# Patient Record
Sex: FEMALE | Race: WHITE
[De-identification: names, ages, dates, MRNs, and addresses within clinical notes are randomized per-mention and may not be internally consistent; named-entity substitution may affect disease eponyms.]

---

## 2019-10-17 ENCOUNTER — HOSPITAL ENCOUNTER (INPATIENT)
Dept: HOSPITAL 74 - JER | Age: 29
LOS: 1 days | Discharge: HOME | DRG: 48 | End: 2019-10-18
Attending: INTERNAL MEDICINE | Admitting: INTERNAL MEDICINE
Payer: COMMERCIAL

## 2019-10-17 VITALS — BODY MASS INDEX: 26.6 KG/M2

## 2019-10-17 DIAGNOSIS — D72.829: ICD-10-CM

## 2019-10-17 DIAGNOSIS — K21.9: ICD-10-CM

## 2019-10-17 DIAGNOSIS — M25.511: ICD-10-CM

## 2019-10-17 DIAGNOSIS — F31.9: ICD-10-CM

## 2019-10-17 DIAGNOSIS — K64.9: ICD-10-CM

## 2019-10-17 DIAGNOSIS — I45.10: ICD-10-CM

## 2019-10-17 DIAGNOSIS — J45.909: ICD-10-CM

## 2019-10-17 DIAGNOSIS — G51.0: Primary | ICD-10-CM

## 2019-10-17 DIAGNOSIS — R32: ICD-10-CM

## 2019-10-17 LAB
ALBUMIN SERPL-MCNC: 4.3 G/DL (ref 3.4–5)
ALP SERPL-CCNC: 87 U/L (ref 45–117)
ALT SERPL-CCNC: 23 U/L (ref 13–61)
ANION GAP SERPL CALC-SCNC: 10 MMOL/L (ref 8–16)
APPEARANCE UR: CLEAR
AST SERPL-CCNC: 14 U/L (ref 15–37)
BASOPHILS # BLD: 0.5 % (ref 0–2)
BILIRUB SERPL-MCNC: 0.2 MG/DL (ref 0.2–1)
BILIRUB UR STRIP.AUTO-MCNC: NEGATIVE MG/DL
BUN SERPL-MCNC: 11.8 MG/DL (ref 7–18)
CALCIUM SERPL-MCNC: 9.3 MG/DL (ref 8.5–10.1)
CHLORIDE SERPL-SCNC: 105 MMOL/L (ref 98–107)
CO2 SERPL-SCNC: 24 MMOL/L (ref 21–32)
COLOR UR: YELLOW
CREAT SERPL-MCNC: 0.8 MG/DL (ref 0.55–1.3)
DEPRECATED RDW RBC AUTO: 13.7 % (ref 11.6–15.6)
EOSINOPHIL # BLD: 1.9 % (ref 0–4.5)
GLUCOSE SERPL-MCNC: 126 MG/DL (ref 74–106)
HCT VFR BLD CALC: 43.1 % (ref 32.4–45.2)
HGB BLD-MCNC: 14.1 GM/DL (ref 10.7–15.3)
KETONES UR QL STRIP: NEGATIVE
LEUKOCYTE ESTERASE UR QL STRIP.AUTO: NEGATIVE
LYMPHOCYTES # BLD: 17.4 % (ref 8–40)
MCH RBC QN AUTO: 30.3 PG (ref 25.7–33.7)
MCHC RBC AUTO-ENTMCNC: 32.8 G/DL (ref 32–36)
MCV RBC: 92.6 FL (ref 80–96)
MONOCYTES # BLD AUTO: 7.7 % (ref 3.8–10.2)
NEUTROPHILS # BLD: 72.5 % (ref 42.8–82.8)
NITRITE UR QL STRIP: NEGATIVE
PH UR: 6 [PH] (ref 5–8)
PLATELET # BLD AUTO: 208 K/MM3 (ref 134–434)
PMV BLD: 10.2 FL (ref 7.5–11.1)
POTASSIUM SERPLBLD-SCNC: 4.2 MMOL/L (ref 3.5–5.1)
PROT SERPL-MCNC: 7.5 G/DL (ref 6.4–8.2)
PROT UR QL STRIP: NEGATIVE
PROT UR QL STRIP: NEGATIVE
RBC # BLD AUTO: 4.65 M/MM3 (ref 3.6–5.2)
SODIUM SERPL-SCNC: 139 MMOL/L (ref 136–145)
SP GR UR: 1.02 (ref 1.01–1.03)
UROBILINOGEN UR STRIP-MCNC: 0.2 MG/DL (ref 0.2–1)
WBC # BLD AUTO: 12.1 K/MM3 (ref 4–10)

## 2019-10-17 PROCEDURE — A9579 GAD-BASE MR CONTRAST NOS,1ML: HCPCS

## 2019-10-17 NOTE — PDOC
Documentation entered by Paloma Knowles SCRIBE, acting as scribe for Jennifer Tejada DO.








Jennifer Tejada DO:  This documentation has been prepared by the Eleni canas Brenda, SCRIBE, under my direction and personally reviewed by me in its 

entirety.  I confirm that the documentation accurately reflects all work, 

treatment, procedures, and medical decision making performed by me.  





Attending Attestation





- Resident


Resident Name: [place]





- ED Attending Attestation


I have performed the following: I have examined & evaluated the patient, The 

case was reviewed & discussed with the resident, I agree w/resident's findings 

& plan, Exceptions are as noted





- HPI


HPI: 





10/17/19 18:43


The patient is a 29 year old female, with a significant PMH of GERD, Bipolar 

disorder and asthma who presents to the emergency department from PCPs office 

for evaluation of a right facial droop. Patient states that a few weeks ago her 

friend noticed that she had a facial droop, but she did not think anything of 

it. She states that when her mom noticed a few days ago, she scheduled an 

appointment with her primary physician today, which she saw his NP for and was 

sent to the ED for evaluation. Patient states that she went on a camping trip 

in May of 2019. She also admits to 1 week of left shoulder pain, and one month 

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 

She also reports 1 month of incontinence, which she states that shell have 

episodes of laughing and urinating, and having wet the bed several times. 





The patient denies chest pain, shortness of breath, headache and dizziness. 

Denies fever, chills, nausea, vomiting, diarrhea and constipation. Denies 

dysuria and hematuria. Denies numbness 





Allergies: NKA


Past surgical history: None reported 


Family History: DM and CVA 


Social history: No tobacco use, alcohol use or illicit drug use. 


PCP: Dr. Keane 








- Physicial Exam


PE: 





10/17/19 18:43


GENERAL: Awake, alert, and fully oriented, in no acute distress


HEAD: No signs of trauma


EYES: PERRLA, EOMI, sclera anicteric, conjunctiva clear


ENT: Auricles normal inspection, hearing grossly normal, nares patent, 

oropharynx clear without exudates. Moist mucosa


NECK: Normal ROM, supple, no lymphadenopathy, JVD, or masses


LUNGS: Breath sounds equal, clear to auscultation bilaterally.  No wheezes, and 

no crackles


HEART: Regular rate and rhythm, normal S1 and S2, no murmurs, rubs or gallops


ABDOMEN: Soft, nontender, normoactive bowel sounds.  No guarding, no rebound.  

No masses


EXTREMITIES: Normal range of motion, no edema.  No clubbing or cyanosis. No 

cords, erythema, or tenderness


NEUROLOGICAL: (+) Right facial droop. Finger to nose normal. Cranial nerves II 

through XII are not intact.  Normal speech, normal gait. 


SKIN: Warm, Dry, normal turgor, no rashes or lesions noted.








- Medical Decision Making





10/17/19 18:54








I, Dr. Jennifer Tejada, DO, attest that this document has been prepared under 

my direction and personally reviewed by me in its entirety.   I further attest, 

that it accurately reflects all work, treatment, procedures and medical decision

-making performed by me.  





a/p: 30yo female with R facial droop and intermittent R hand numbness/tingling, 

intermittent episodes of incontinence


-went hiking in May


-no rashes she noticed


-intermittent R shoulder pain


-3 weeks of R facial droop


-1 m of incontinence -nocturia


-1m of R hand weakness episodes -will drop the paint brush for unknown reason


-will send labs, head ct 


-will send lyme


-ebv


-will discuss with neurology


-concern for lyme vs ms vs intracranial lesion


-will need MRI if head ct neg


10/17/19 20:41


no acute findings on head ct


10/17/19 21:05


resident discussed the case with Dr. Haney 


10/17/19 21:09


dr. haney requests steroids


no abx


will see in consult


MRI head and c spine with contrast


10/17/19 21:22


resident discussed the case with symphony and accepts pt to service





**Heart Score/ECG Review





- ECG Intrepretation


Comment:: 





10/17/19 19:34


sinus santo at 57, incomplete RBBB, no acute st/t wave findings

## 2019-10-17 NOTE — PDOC
History of Present Illness





- General


Chief Complaint: Facial Droop


Stated Complaint: SENT BY DOCTOR


Time Seen by Provider: 10/17/19 16:44


History Source: Patient


Exam Limitations: No Limitations





- History of Present Illness


Initial Comments: 





10/17/19 17:47


Carrie Smith is a 29yF w PMHx GERD, asthma, bipolar presenting w R mouth droop 

and urinary incontinence. 3wk sudden onset R mouth droop, urinary incontinence (

urinating pants, bed), intermittent R hand  strength weakness (dropping 

things). Also has 3mo epigastric discomfort attributed to GERD, taking daily 

pepcid.  Switched to lithium 2 months ago, psychiatrist denies lithium causing 

symptoms. Denies recent travel, family hx autoimmune disorders. Denies alcohol, 

smoking, illicit drug use. Denies fever, headache, cough, SOB, chest pain, 

bowel movement changes. Went hiking/camping in May. 








tPA Exclusion Checklist 0-3hr





- Time Elapsed


Date last known well: 09/17/19


Time last known well: 00:00


Elaspsed time: 30 Day(s) and 23 Hour(s) and 7 Minutes 





- Thrombolytic Therapy Candidate


Is the patient eligible for Thrombolytic Therapy?: No





- Exclusion Criteria 0-3hr


SBP greater than 185 or DBP greater than 110mmHg despite tx: No


Recent IC/spinal surgery,head trauma or stroke w/in last 3mo: No


Hx of previous IC hemorrhage, IC neoplasm, AVM or aneurysm: No


Active internal bleeding: No


Blding diathesis(low plt ct, inc PTT,INR>1.7 or use of NOAC): No


Symptoms suggest subarachnoid hemorrhage: No


CT demonstrates multilobar infarct(>1/3 cerebral hemiphere): No


Arterial puncture at noncompressible site in previous 7 days: No


Blood glucose concentration less than 50mg/dL (2.7mmol/L): No





- Relative Exclusion Criteria 0-3h


Life expectancy <1yr/severe co-morbid illness/CMO on admit: No


Pregnancy: No


Patient/family refused: No


Rapid improvement: No


Stroke severity too mild: Yes


Recent acute MI (w/in previous 3 months): No


Seizure at onset with postictal residual neuro impairments: No


Major surgery or serious trauma w/in previous 14 days: No


Recent GI or  hemorrhage (w/in previous 21 days): No





- Ineligibility reason(s)


Reasons No tPA given: Outside of window - delayed arrival (30d facial droop)





NIH Stroke Scale





- Last Known Well Date/Time & Onset


Date Last Known Well: 09/17/19


Time Last Known Well: 00:00





- Initial Evaluation


Level of consciousness: Alert


Ask patient the month and their age: Answers both correctly


Ask patient to open & close eyes; make fist and let go: Obeys both correctly


Best gaze (horizontal eye movement): Normal


Visual field testing: No visual field loss


Facial paresis (Show teeth/raise eyebrows/close eyes tight): Minor paralysis (

flattened nasolabial fold, asymmetry on smiling)


Motor Function: Left Arm: Normal


Motor Function:  Right Arm: Normal (extends arm 90 (or 45) degrees for 10 

seconds without drift


Motor Function:  Left Leg: Normal (extends leg 30 degrees for 5 seconds without 

drift)


Motor Function:  Right Leg: Normal (extends leg 30 degrees for 5 seconds 

without drift)


Limb Ataxia: No ataxia


Sensory(Use pinprick test arms,legs,trunk,face/side to side): Normal


Best language (Describe picture, name items, read sentences): No Aphasia


Dysarthria (read several words): Normal articulation


Extinction and Inattention: No abnormality





- Total Score


NIH Stroke Scale Score: 1





Past History





- Travel


Traveled outside of the country in the last 30 days: No


Close contact w/someone who was outside of country & ill: No





- Past Medical History


Allergies/Adverse Reactions: 


 Allergies











Allergy/AdvReac Type Severity Reaction Status Date / Time


 


No Known Allergies Allergy   Verified 10/17/19 16:47











Asthma: Yes


COPD: No


GI Disorders: Yes (GERD)


Psychiatric Problems: Yes (Bipolar)





- Immunization History


Immunization Up to Date: No





- Psycho Social/Smoking Cessation Hx


Smoking History: Never smoked


Have you smoked in the past 12 months: No


Information on smoking cessation initiated: No


Hx Alcohol Use: No


Drug/Substance Use Hx: No





**Review of Systems





- Review of Systems


Able to Perform ROS?: Yes


Constitutional: No: Chills, Fever


HEENTM: No: Eye Pain, Recent change in vision, Nose Pain, Throat Pain, Mouth 

Pain


Respiratory: No: Cough, Shortness of Breath


Cardiac (ROS): No: Chest Pain, Palpitations, Syncope


ABD/GI: No: Abdominal Distended, Constipated, Diarrhea, Nausea, Vomiting


: Yes: Incontinence.  No: Dysuria, Discharge, Flank Pain, Hematuria


Musculoskeletal: No: Back Pain, Joint Pain, Muscle Pain


Integumentary: No: Bruising, Flushing, Lesions


Neurological: No: Headache, Numbness, Seizure, Tingling, Tremors


Psychiatric: No: Anxiety, Depression, Stressors


Endocrine: No: Excessive Sweating, Flushing, Intolerance to Cold, Intolerance 

to Heat


Hematologic/Lymphatic: No: Anemia, Blood Clots





*Physical Exam





- Vital Signs


 Last Vital Signs











Temp Pulse Resp BP Pulse Ox


 


 98.2 F   71   18   126/70   98 


 


 10/17/19 16:44  10/17/19 16:44  10/17/19 16:44  10/17/19 16:44  10/17/19 16:44














- Physical Exam


General Appearance: Yes: Nourished, Appropriately Dressed.  No: Apparent 

Distress


HEENT: positive: EOMI, KALEE, Normal Voice, Hearing Grossly Normal.  negative: 

Scleral Icterus (R), Scleral Icterus (L), Nasal Congestion, Rhinorrhea


Respiratory/Chest: positive: Lungs Clear, Normal Breath Sounds.  negative: 

Chest Tender, Respiratory Distress, Crackles, Rales, Rhonchi, Stridor, Wheezing


Cardiovascular: positive: Regular Rhythm, Regular Rate, S1, S2.  negative: Edema

, Murmur


Gastrointestinal/Abdominal: positive: Normal Bowel Sounds, Flat, Soft.  negative

: Tender, Organomegaly, Distended, Guarding


Musculoskeletal: negative: CVA Tenderness (R), CVA Tenderness (L)


Integumentary: positive: Normal Color


Neurologic: positive: CNs II-XII NML intact, Fully Oriented, Alert, Normal 

Response, Motor Strength 5/5, Facial Droop (R mouth).  negative: Confused, 

Disoriented





ED Treatment Course





- LABORATORY


CBC & Chemistry Diagram: 


 10/17/19 18:45





 10/17/19 18:45





Medical Decision Making





- Medical Decision Making





10/17/19 17:49


CBC CMP trop UA EKG Head CT Lyme HSV EBV


Head CT shows no acute bleed/infarct. Minimal mucosal thickening in L 

paramedian aspect of sphenoid sinus concerning for small retension cyst vs 

polyp 7mm.


WBC 12, trop neg, neg HCG, normal UA


EKG sinus bradycardia HR 57, QTc 412, RBBB


Lurasidone, lithium, buspirone for bipolar, maalox, pantoprazole for epigastric 

discomfort





---





Carrie Smith is a 29yF w PMHx GERD, asthma, bipolar presenting w 3wk R mouth 

droop, urinary incontinence, R hand  weakness concerning for multiple 

sclerosis vs bell's palsy vs Lyme. Not pregnant, no evidence of ACS (no ST 

changes EKG, neg trop) or UTI. Low concern for CVA (NIHSS score 1, no risk 

factors, no head bleed/infarct on CT, tPA not given). Pending Lyme, HSV, EBV, 

lithium. Given lurasidone, lithium, buspirone for bipolar, maalox, pantoprazole 

for epigastric discomfort, prednisone 





Consulted Dr Alegria neuro


-advised brain/c-spine MRI w contrast rule out MS, give prednisone





Admitted to Dr Urbano med/surg for R mouth droop, urinary incontinence, R hand 

 weakness


-pending brain/c-spine MRI








Discharge





- Discharge Information


Problems reviewed: Yes


Clinical Impression/Diagnosis: 


 Facial droop





Urinary incontinence


Qualifiers:


 Urinary Incontinence type: unspecified incontinence Qualified Code(s): R32 - 

Unspecified urinary incontinence





Condition: Good





- Follow up/Referral





- Patient Discharge Instructions





- Post Discharge Activity

## 2019-10-17 NOTE — PDOC
Rapid Medical Evaluation


Time Seen by Provider: 10/17/19 16:44


Medical Evaluation: 





10/17/19 16:44


I have performed a brief in-person evaluation of this patient.


The patient presents with a chief complaint of: facial droop to R side x 3 weeks

, sent by Dr. Kay for MRI, denies AMS/change in vision/


Pertinent physical exam findings: R facial droop/ twitching to R eye


I have ordered the following: nothing 


The patient will proceed to the ED for further evaluation.





**Discharge Disposition





- Diagnosis


 Facial droop








- Referrals





- Patient Instructions





- Post Discharge Activity

## 2019-10-18 VITALS — DIASTOLIC BLOOD PRESSURE: 58 MMHG | TEMPERATURE: 99 F | HEART RATE: 71 BPM | SYSTOLIC BLOOD PRESSURE: 105 MMHG

## 2019-10-18 LAB
AMPHET UR-MCNC: NEGATIVE NG/ML
ANION GAP SERPL CALC-SCNC: 8 MMOL/L (ref 8–16)
BARBITURATES UR-MCNC: NEGATIVE NG/ML
BENZODIAZ UR SCN-MCNC: NEGATIVE NG/ML
BUN SERPL-MCNC: 12.1 MG/DL (ref 7–18)
CALCIUM SERPL-MCNC: 9.4 MG/DL (ref 8.5–10.1)
CHLORIDE SERPL-SCNC: 106 MMOL/L (ref 98–107)
CO2 SERPL-SCNC: 24 MMOL/L (ref 21–32)
COCAINE UR-MCNC: NEGATIVE NG/ML
CREAT SERPL-MCNC: 0.9 MG/DL (ref 0.55–1.3)
DEPRECATED RDW RBC AUTO: 13.4 % (ref 11.6–15.6)
ERYTHROCYTE [SEDIMENTATION RATE] IN BLOOD BY WESTERGREN METHOD: 3 MM/HR (ref 0–20)
GLUCOSE SERPL-MCNC: 139 MG/DL (ref 74–106)
HCT VFR BLD CALC: 40.2 % (ref 32.4–45.2)
HGB BLD-MCNC: 13.7 GM/DL (ref 10.7–15.3)
MCH RBC QN AUTO: 31.3 PG (ref 25.7–33.7)
MCHC RBC AUTO-ENTMCNC: 34 G/DL (ref 32–36)
MCV RBC: 92.1 FL (ref 80–96)
METHADONE UR-MCNC: NEGATIVE NG/ML
OPIATES UR QL SCN: NEGATIVE NG/ML
PCP UR QL SCN: NEGATIVE NG/ML
PLATELET # BLD AUTO: 181 K/MM3 (ref 134–434)
PMV BLD: 10.2 FL (ref 7.5–11.1)
POTASSIUM SERPLBLD-SCNC: 4.6 MMOL/L (ref 3.5–5.1)
RBC # BLD AUTO: 4.36 M/MM3 (ref 3.6–5.2)
SODIUM SERPL-SCNC: 138 MMOL/L (ref 136–145)
WBC # BLD AUTO: 10.6 K/MM3 (ref 4–10)

## 2019-10-18 NOTE — PN
Teaching Attending Note


Name of Resident: Nadiya Almeida





ATTENDING PHYSICIAN STATEMENT





I saw and evaluated the patient.


I reviewed the resident's note and discussed the case with the resident.


I agree with the resident's findings and plan as documented with exceptions 

below.








SUBJECTIVE:


Patient seen and examined. no new complaints, overall unchanged





OBJECTIVE:


 Vital Signs











 Period  Temp  Pulse  Resp  BP Sys/Cavazos  Pulse Ox


 


 Last 24 Hr  98.2 F-99 F  52-71  17-18  101-126/58-70  








 Intake & Output











 10/15/19 10/16/19 10/17/19 10/18/19





 23:59 23:59 23:59 23:59


 


Weight   160 lb 











General: sitting in stretcher, no new complaints


Neck: soft, supple


Chest: CTAB, no rales or wheezing


Abdomen:soft, NT


Extremities: no edema


Neuro: AAOx3, mild right nasolabial flattening, non focal exam otherwise





MRI brain/C-spine results reviewed





ASSESSMENT AND PLAN:


29 yof with PMHx of GERD, Asthma, hemorrhoids, Bipolar disorder, admitted with 

right hand weakness, urinary incontinence and right facial droop gradual over 

the period of last month





-Right facial droop, ?Bell's palsy


-Recently diagnosed bipolar disorder


-Asthma


-GERD


-Haemorrhoids





Plan:


MRI brain/Cspine non concerning


neurology input noted


Discussed with patient outpatient neurology follow up for EMG, outpt psych 

follow up


dc home today


discussed with patient in detail, all questions answered.

## 2019-10-18 NOTE — EKG
Test Reason : 

Blood Pressure : ***/*** mmHG

Vent. Rate : 057 BPM     Atrial Rate : 057 BPM

   P-R Int : 150 ms          QRS Dur : 104 ms

    QT Int : 424 ms       P-R-T Axes : 065 030 046 degrees

   QTc Int : 412 ms

 

SINUS BRADYCARDIA

POSSIBLE LEFT ATRIAL ENLARGEMENT

INCOMPLETE RIGHT BUNDLE BRANCH BLOCK

NO PREVIOUS ECGS AVAILABLE

Confirmed by BELKIS PADILLA MD ([id number]) on 10/18/2019 12:49:56 PM

 

Referred By:             Confirmed By:BELIKS PADILLA MD

## 2019-10-18 NOTE — PN
Teaching Attending Note


Name of Resident: Meena Power





ATTENDING PHYSICIAN STATEMENT





I saw and evaluated the patient.


I reviewed the resident's note and discussed the case with the resident.


I agree with the resident's findings and plan as documented.








SUBJECTIVE:


29yF w PMHx GERD, asthma, bipolar presenting w R mouth droop and urinary 

incontinence. 2 wk sudden onset R mouth droop, urinary incontinence (urinating 

pants, bed), intermittent R hand  strength weakness (dropping things). 

Started taking lithium 2months ago. 





OBJECTIVE:


 Last Vital Signs











Temp Pulse Resp BP Pulse Ox


 


 98.2 F   52 L  18   101/62   97 


 


 10/17/19 16:44  10/18/19 00:48  10/18/19 00:48  10/18/19 00:48  10/18/19 00:48








gen -nontoxic appearing, not in distress 


heent perrla, no sinus tenderness 


neck - no jvd 


cv s1+s2+rrr


chest clear b/l 


abd -soft, bs +, nt


ext - no edema 





 Abnormal Lab Results











  10/17/19 10/17/19





  18:45 18:45


 


WBC  12.1 H 


 


Absolute Neuts (auto)  8.7 H 


 


Random Glucose   126 H


 


AST   14 L





imaging reviewed 








ASSESSMENT AND PLAN:


#right facial droop, urinary incontinence,Right hand  weakness -multiple 

neurological symptoms which are  in time, concerning for  demylinating 

CNS disease such as MS. Differential diagnosis includes also syphilis, vit b12 

deficiency. Should also rule out lithium toxicity as patient has recently 

started taking lithium for her bipolar management. 


-med/surg 


-brain mri 


-lyme serology 


-rpr 


-vit b12 levels 


-urine toxicology 


-send lithium level 


-consider neurology evaluation 


-dvt ppx -heparin sc

## 2019-10-18 NOTE — DS
Physical Exam: 


SUBJECTIVE: Patient seen and examined. She reports 








OBJECTIVE:





 Vital Signs











 Period  Temp  Pulse  Resp  BP Sys/Cavazos  Pulse Ox


 


 Last 24 Hr  99 F  52-71  17-18  101-105/58-62  97-99








PHYSICAL EXAM





GENERAL: The patient is awake, alert, and fully oriented, in no acute distress.


HEAD: Normal with no signs of trauma.


EYES: PERRL, extraocular movements intact, sclera anicteric, conjunctiva clear. 


ENT: Ears normal, nares patent, oropharynx clear without exudates, moist mucous 

membranes.


NECK: Trachea midline, full range of motion, supple. 


LUNGS: Breath sounds equal, clear to auscultation bilaterally, no wheezes, no 

crackles, no accessory muscle use. 


HEART: Regular rate and rhythm, S1, S2 without murmur, rub or gallop.


ABDOMEN: Soft, nontender, nondistended, normoactive bowel sounds, no guarding, 

no rebound, no hepatosplenomegaly, no masses.


EXTREMITIES: 2+ pulses, warm, well-perfused, no edema. 


NEUROLOGICAL: Cranial nerves II through XII grossly intact. Normal speech, gait 

not observed.


PSYCH: Normal mood, normal affect.


SKIN: Warm, dry, normal turgor, no rashes or lesions noted.





LABS


 Laboratory Results - last 24 hr











  10/17/19 10/17/19 10/17/19





  18:45 18:45 18:45


 


WBC  12.1 H  


 


RBC  4.65  


 


Hgb  14.1  


 


Hct  43.1  


 


MCV  92.6  


 


MCH  30.3  


 


MCHC  32.8  


 


RDW  13.7  


 


Plt Count  208  


 


MPV  10.2  


 


Absolute Neuts (auto)  8.7 H  


 


Neutrophils %  72.5  


 


Lymphocytes %  17.4  


 


Monocytes %  7.7  


 


Eosinophils %  1.9  


 


Basophils %  0.5  


 


Nucleated RBC %  0  


 


ESR   


 


Sodium   139 


 


Potassium   4.2 


 


Chloride   105 


 


Carbon Dioxide   24 


 


Anion Gap   10 


 


BUN   11.8 


 


Creatinine   0.8 


 


Est GFR (CKD-EPI)AfAm   117.10 


 


Est GFR (CKD-EPI)NonAf   101.04 


 


Random Glucose   126 H 


 


Calcium   9.3 


 


Total Bilirubin   0.2 


 


AST   14 L 


 


ALT   23 


 


Alkaline Phosphatase   87 


 


Troponin I    < 0.02


 


C-Reactive Protein   


 


Total Protein   7.5 


 


Albumin   4.3 


 


Vitamin B12   


 


Serum Folate   


 


TSH   


 


Serum Pregnancy, Qual   


 


Urine Color   


 


Urine Appearance   


 


Urine pH   


 


Ur Specific Gravity   


 


Urine Protein   


 


Urine Glucose (UA)   


 


Urine Ketones   


 


Urine Blood   


 


Urine Nitrite   


 


Urine Bilirubin   


 


Urine Urobilinogen   


 


Ur Leukocyte Esterase   


 


Opiates Screen   


 


Methadone Screen   


 


Barbiturate Screen   


 


Phencyclidine Screen   


 


Ur Amphetamines Screen   


 


MDMA (Ecstasy) Screen   


 


Benzodiazepines Screen   


 


Cocaine Screen   


 


U Marijuana (THC) Screen   


 


RPR Titer   














  10/17/19 10/17/19 10/18/19





  18:45 18:45 03:15


 


WBC   


 


RBC   


 


Hgb   


 


Hct   


 


MCV   


 


MCH   


 


MCHC   


 


RDW   


 


Plt Count   


 


MPV   


 


Absolute Neuts (auto)   


 


Neutrophils %   


 


Lymphocytes %   


 


Monocytes %   


 


Eosinophils %   


 


Basophils %   


 


Nucleated RBC %   


 


ESR   


 


Sodium   


 


Potassium   


 


Chloride   


 


Carbon Dioxide   


 


Anion Gap   


 


BUN   


 


Creatinine   


 


Est GFR (CKD-EPI)AfAm   


 


Est GFR (CKD-EPI)NonAf   


 


Random Glucose   


 


Calcium   


 


Total Bilirubin   


 


AST   


 


ALT   


 


Alkaline Phosphatase   


 


Troponin I   


 


C-Reactive Protein   


 


Total Protein   


 


Albumin   


 


Vitamin B12   


 


Serum Folate   


 


TSH   


 


Serum Pregnancy, Qual   Negative 


 


Urine Color  Yellow  


 


Urine Appearance  Clear  


 


Urine pH  6.0  


 


Ur Specific Gravity  1.020  


 


Urine Protein  Negative  


 


Urine Glucose (UA)  Negative  


 


Urine Ketones  Negative  


 


Urine Blood  Negative  


 


Urine Nitrite  Negative  


 


Urine Bilirubin  Negative  


 


Urine Urobilinogen  0.2  


 


Ur Leukocyte Esterase  Negative  


 


Opiates Screen    Cancelled


 


Methadone Screen    Cancelled


 


Barbiturate Screen    Cancelled


 


Phencyclidine Screen    Cancelled


 


Ur Amphetamines Screen    Cancelled


 


MDMA (Ecstasy) Screen    Cancelled


 


Benzodiazepines Screen    Cancelled


 


Cocaine Screen    Cancelled


 


U Marijuana (THC) Screen    Cancelled


 


RPR Titer   














  10/18/19 10/18/19 10/18/19





  05:22 05:22 05:22


 


WBC   10.6 H 


 


RBC   4.36 


 


Hgb   13.7 


 


Hct   40.2 


 


MCV   92.1 


 


MCH   31.3 


 


MCHC   34.0 


 


RDW   13.4 


 


Plt Count   181 


 


MPV   10.2 


 


Absolute Neuts (auto)   


 


Neutrophils %   


 


Lymphocytes %   


 


Monocytes %   


 


Eosinophils %   


 


Basophils %   


 


Nucleated RBC %   


 


ESR   3 


 


Sodium  138  


 


Potassium  4.6  


 


Chloride  106  


 


Carbon Dioxide  24  


 


Anion Gap  8  


 


BUN  12.1  


 


Creatinine  0.9  


 


Est GFR (CKD-EPI)AfAm  101.56  


 


Est GFR (CKD-EPI)NonAf  87.63  


 


Random Glucose  139 H  


 


Calcium  9.4  


 


Total Bilirubin   


 


AST   


 


ALT   


 


Alkaline Phosphatase   


 


Troponin I   


 


C-Reactive Protein  1.1 H  


 


Total Protein   


 


Albumin   


 


Vitamin B12  987 H  


 


Serum Folate  18 H  


 


TSH  1.81  


 


Serum Pregnancy, Qual   


 


Urine Color   


 


Urine Appearance   


 


Urine pH   


 


Ur Specific Gravity   


 


Urine Protein   


 


Urine Glucose (UA)   


 


Urine Ketones   


 


Urine Blood   


 


Urine Nitrite   


 


Urine Bilirubin   


 


Urine Urobilinogen   


 


Ur Leukocyte Esterase   


 


Opiates Screen   


 


Methadone Screen   


 


Barbiturate Screen   


 


Phencyclidine Screen   


 


Ur Amphetamines Screen   


 


MDMA (Ecstasy) Screen   


 


Benzodiazepines Screen   


 


Cocaine Screen   


 


U Marijuana (THC) Screen   


 


RPR Titer    Nonreactive














  10/18/19





  08:57


 


WBC 


 


RBC 


 


Hgb 


 


Hct 


 


MCV 


 


MCH 


 


MCHC 


 


RDW 


 


Plt Count 


 


MPV 


 


Absolute Neuts (auto) 


 


Neutrophils % 


 


Lymphocytes % 


 


Monocytes % 


 


Eosinophils % 


 


Basophils % 


 


Nucleated RBC % 


 


ESR 


 


Sodium 


 


Potassium 


 


Chloride 


 


Carbon Dioxide 


 


Anion Gap 


 


BUN 


 


Creatinine 


 


Est GFR (CKD-EPI)AfAm 


 


Est GFR (CKD-EPI)NonAf 


 


Random Glucose 


 


Calcium 


 


Total Bilirubin 


 


AST 


 


ALT 


 


Alkaline Phosphatase 


 


Troponin I 


 


C-Reactive Protein 


 


Total Protein 


 


Albumin 


 


Vitamin B12 


 


Serum Folate 


 


TSH 


 


Serum Pregnancy, Qual 


 


Urine Color 


 


Urine Appearance 


 


Urine pH 


 


Ur Specific Gravity 


 


Urine Protein 


 


Urine Glucose (UA) 


 


Urine Ketones 


 


Urine Blood 


 


Urine Nitrite 


 


Urine Bilirubin 


 


Urine Urobilinogen 


 


Ur Leukocyte Esterase 


 


Opiates Screen  Negative


 


Methadone Screen  Negative


 


Barbiturate Screen  Negative


 


Phencyclidine Screen  Negative


 


Ur Amphetamines Screen  Negative


 


MDMA (Ecstasy) Screen  Negative


 


Benzodiazepines Screen  Negative


 


Cocaine Screen  Negative


 


U Marijuana (THC) Screen  Negative


 


RPR Titer 











HOSPITAL COURSE:





Date of Admission:10/17/19





Date of Discharge: 10/18/19














Discharge Summary


Problems reviewed: Yes


Reason For Visit: URINARY INCONTINENCE/FACIAL DROOP


Condition: Stable





- Instructions


Diet, Activity, Other Instructions: 


You presented to the hospital with urinary incontinence concerning for a 

neurological disease. You had an MRI of brain and cervical spine done and were 

evaluated by neurology; You are able to return home today. 





Follow up with the following physicians:


1. PCP in one week, please call to schedule follow up to further manage your 

diabetes


2. Neurology in 2 weeks for outpatient EMG


3. Psychiatry/Psychology in 1 week





Please continue to monitor your diet as you need to intake less sugar and drink 

plenty of fluids. 





Continue all your other medications as prescribed





Please return to the ER if you have any signs or symptoms of chest pain, 

shortness of breath, uncontrollable fever, chills, nausea, vomiting, numbness, 

tingling, or weakness in any part of your body, changes in vision, or slurred 

speech.





Please return to the ER if symptoms persist, worsen, or new symptoms arise.





Referrals: 


Pita Benoit MD [Primary Care Provider] - 


Mike Alegria DO [Staff Physician] - 


Disposition: HOME





- Home Medications


Comprehensive Discharge Medication List: 


Ambulatory Orders





Buspirone HCl [Buspar -] 10 mg BID 10/18/19 


Lithium Carbonate [Eskalith -] 1,050 mg PO DAILY 10/18/19 


Lurasidone HCl [Latuda] 80 mg PO DAILY 10/18/19 


Quetiapine Fumarate [Seroquel -] 25 mg DAILY 10/18/19 











ATTENDING PHYSICIAN STATEMENT





I saw and evaluated the patient.


I reviewed the resident's note and discussed the case with the resident.


I agree with the resident's findings and plan as documented.








SUBJECTIVE:








OBJECTIVE:








ASSESSMENT AND PLAN:

## 2019-10-18 NOTE — CON.NEURO
Consult





- History of Present Illness


History of Present Illness: 


29yF w PMHx GERD, asthma, bipolar presenting w R mouth droop and urinary 

incontinence. 3wk sudden onset R mouth droop, urinary incontinence (urinating 

pants, bed), intermittent R hand  strength weakness (dropping things). Also 

has 3mo epigastric discomfort attributed to GERD, taking daily pepcid.  

Switched to lithium 2 months ago, psychiatrist denies lithium causing symptoms. 

Denies recent travel, family hx autoimmune disorders. Denies alcohol, smoking, 

illicit drug use. Denies fever, headache, cough, SOB, chest pain, bowel 

movement changes. Went hiking/camping in May. 


LYME -P, B12/TSH NL, CT HD (-), tox not sent ( ?) . lithium level P 














- Alcohol/Substance Use


Hx Alcohol Use: No





- Smoking History


Smoking history: Never smoked


Have you smoked in the past 12 months: No





Home Medications





- Allergies


Allergies/Adverse Reactions: 


 Allergies











Allergy/AdvReac Type Severity Reaction Status Date / Time


 


No Known Allergies Allergy   Verified 10/17/19 16:47














- Home Medications


Home Medications: 


Ambulatory Orders





Buspirone HCl [Buspar -] 5 mg PO BID 10/18/19 


Lithium Carbonate [Eskalith -] 1,050 mg PO DAILY 10/18/19 


Lurasidone HCl [Latuda] 80 mg PO DAILY 10/18/19 











Physical Exam-Neuro


Vital Signs: 


 Vital Signs











Temperature  99 F   10/18/19 08:55


 


Pulse Rate  71   10/18/19 08:55


 


Respiratory Rate  17   10/18/19 08:55


 


Blood Pressure  105/58 L  10/18/19 08:55


 


O2 Sat by Pulse Oximetry (%)  97   10/18/19 00:48











Labs: 


 CBC, BMP





 10/18/19 05:22 





 10/18/19 05:22 











- Neuro Exam


Level Of Consciousness: Yes: Alert (mild facial assymetry, no clear facial 

nerve palsy ( ? swollne bit lip) , no focal weakness, though mild reduced R 

, reflexes NL )





Assessment/Plan


29yF w PMHx GERD, asthma, bipolar presenting w R mouth droop and urinary 

incontinence. 3wk sudden onset R mouth droop, urinary incontinence (urinating 

pants, bed), intermittent R hand  strength weakness (dropping things). Also 

has 3mo epigastric discomfort attributed to GERD, taking daily pepcid.  

Switched to lithium 2 months ago, psychiatrist denies lithium causing symptoms. 

Denies recent travel, family hx autoimmune disorders. Denies alcohol, smoking, 

illicit drug use. Denies fever, headache, cough, SOB, chest pain, bowel 

movement changes. Went hiking/camping in May. 


LYME -P, B12/TSH NL, CT HD (-), tox not sent ( ?) . lithium level P 


MRI BRAIN prelim ; to my EYE WNL--no evidence of demyelination 





AP : 


new facialy palsy- ? incomplete , partial [place] vs other 


no evidence of MS/demyelination , can hold off steroids, acycolvir as very mild 

Sx 


R hand weakness- CTS vs radiclopathy, can get outpt EMG 


PSych status stable --can FU with outpt PSYCH 


neuro stable for DC and can FU in office 


[phone]





DR POLLACK

## 2019-10-18 NOTE — HP
CHIEF COMPLAINT:





PCP: Dr. Benoit





HISTORY OF PRESENT ILLNESS:


30 y/o/f with PMhx of GERD, Asthma, hemorrhoids, Bipolar disorder sent here by 

PMD for right sided facial droop and urinary incontinence. She first noticed 

the right sided facial droop 3 weeks ago and thinks it has been getting worse 

since then. She has had three episodes of urinary incontinence while sleeping 

over the last month and denies having incontinence in the past. She complains 

of epigastric discomfort for the last three months and has been seen by her 

primary regarding this, was started on Famotidine with improvement. She also 

complains of weakness in her right arm/hand over the last year. She has 

shoulder spasms in her right side that occur randomly and pain that shoots down 

her arm. She works as a  and has noticed that she will occasionally 

drop her brush randomly. She has intermittent numbness in her hand that is 

worse after a day of working. She was hospitalized at [facility] in August of 

this year and diagnosed with Bipolar disorder, started on lithium at this time. 

Her lithium dose was increased 2 weeks ago. She has chronic diarrhea, has not 

seen blood in her stool recently but does have a history of hemorrhoids and was 

treated for them. She denies any tick bites ever but did go hiking/camping 

earlier this year. She denies chest pain, SOB, fever, headache, cough, nausea, 

vomiting, hallucinations, dysuria, abdominal pain.   








ER course was notable for:


(1) Neuro consulted, Dr. Alegria - advised brain/c-spine MRI w/contrast to rule 

out MS and to give prednisone. low concern for stroke 





Recent Travel: none





PAST MEDICAL HISTORY:


GERD, Asthma, Bipolar disorder 





PAST SURGICAL HISTORY:


tonsillectomy, septoplasty 





Social History:


Smokin cigar per week


Alcohol: 1-2 glasses of wine per week


Drugs: denies





FamHx: DM, heart disease, GI cancer. Both parents are alive and healthy 





Allergies





No Known Allergies Allergy (Verified 10/17/19 16:47)


 








HOME MEDICATIONS:


REVIEW OF SYSTEMS


as per HPI








PHYSICAL EXAMINATION


 Vital Signs - 24 hr











  10/17/19 10/17/19 10/18/19





  16:44 17:55 00:48


 


Temperature 98.2 F  


 


Pulse Rate 71  


 


Pulse Rate [   52 L





Left Radial]   


 


Respiratory 18  18





Rate   


 


Blood Pressure 126/70  


 


Blood Pressure   101/62





[Right Arm]   


 


O2 Sat by Pulse 98 100 97





Oximetry (%)   











GENERAL: Awake, alert, and fully oriented, in no acute distress.


HEAD: Normal with no signs of trauma.


EYES: Pupils equal, round and reactive to light, extraocular movements intact, 

sclera anicteric, conjunctiva clear. No lid lag.


EARS, NOSE, THROAT: Ears normal, nares patent, oropharynx clear without 

exudates. Moist mucous membranes.


NECK: Normal range of motion, supple without lymphadenopathy, JVD, or masses.


LUNGS: Breath sounds equal, clear to auscultation bilaterally. No wheezes, and 

no crackles. No accessory muscle use.


HEART: Regular rate and rhythm, normal S1 and S2 without murmur, rub or gallop.


ABDOMEN: Soft, nontender, not distended, normoactive bowel sounds, no guarding, 

no rebound, no masses.  No hepatomegaly or  splenomegaly. 


MUSCULOSKELETAL: Normal range of motion at all joints. No bony deformities or 

tenderness. No CVA tenderness.


UPPER EXTREMITIES: 2+ pulses, warm, well-perfused. No cyanosis. No clubbing. No 

peripheral edema.


LOWER EXTREMITIES: 2+ pulses, warm, well-perfused. No calf tenderness. No 

peripheral edema. 


NEUROLOGICAL: Right sided lower facial droop. Cranial nerves II-XII intact. 

Normal speech. 5/5 strength upper and lower extremities. Normal finger to nose. 


PSYCHIATRIC: Cooperative. Good eye contact. Appropriate mood and affect.


SKIN: Warm, dry, normal turgor, no rashes or lesions noted, normal capillary 

refill. 





 Laboratory Results - last 24 hr











  10/17/19 10/17/19 10/17/19





  18:45 18:45 18:45


 


WBC  12.1 H  


 


RBC  4.65  


 


Hgb  14.1  


 


Hct  43.1  


 


MCV  92.6  


 


MCH  30.3  


 


MCHC  32.8  


 


RDW  13.7  


 


Plt Count  208  


 


MPV  10.2  


 


Absolute Neuts (auto)  8.7 H  


 


Neutrophils %  72.5  


 


Lymphocytes %  17.4  


 


Monocytes %  7.7  


 


Eosinophils %  1.9  


 


Basophils %  0.5  


 


Nucleated RBC %  0  


 


Sodium   139 


 


Potassium   4.2 


 


Chloride   105 


 


Carbon Dioxide   24 


 


Anion Gap   10 


 


BUN   11.8 


 


Creatinine   0.8 


 


Est GFR (CKD-EPI)AfAm   117.10 


 


Est GFR (CKD-EPI)NonAf   101.04 


 


Random Glucose   126 H 


 


Calcium   9.3 


 


Total Bilirubin   0.2 


 


AST   14 L 


 


ALT   23 


 


Alkaline Phosphatase   87 


 


Troponin I    < 0.02


 


Total Protein   7.5 


 


Albumin   4.3 


 


Serum Pregnancy, Qual   


 


Urine Color   


 


Urine Appearance   


 


Urine pH   


 


Ur Specific Gravity   


 


Urine Protein   


 


Urine Glucose (UA)   


 


Urine Ketones   


 


Urine Blood   


 


Urine Nitrite   


 


Urine Bilirubin   


 


Urine Urobilinogen   


 


Ur Leukocyte Esterase   


 


Opiates Screen   


 


Methadone Screen   


 


Barbiturate Screen   


 


Phencyclidine Screen   


 


Ur Amphetamines Screen   


 


MDMA (Ecstasy) Screen   


 


Benzodiazepines Screen   


 


Cocaine Screen   


 


U Marijuana (THC) Screen   














  10/17/19 10/17/19 10/18/19





  18:45 18:45 03:15


 


WBC   


 


RBC   


 


Hgb   


 


Hct   


 


MCV   


 


MCH   


 


MCHC   


 


RDW   


 


Plt Count   


 


MPV   


 


Absolute Neuts (auto)   


 


Neutrophils %   


 


Lymphocytes %   


 


Monocytes %   


 


Eosinophils %   


 


Basophils %   


 


Nucleated RBC %   


 


Sodium   


 


Potassium   


 


Chloride   


 


Carbon Dioxide   


 


Anion Gap   


 


BUN   


 


Creatinine   


 


Est GFR (CKD-EPI)AfAm   


 


Est GFR (CKD-EPI)NonAf   


 


Random Glucose   


 


Calcium   


 


Total Bilirubin   


 


AST   


 


ALT   


 


Alkaline Phosphatase   


 


Troponin I   


 


Total Protein   


 


Albumin   


 


Serum Pregnancy, Qual   Negative 


 


Urine Color  Yellow  


 


Urine Appearance  Clear  


 


Urine pH  6.0  


 


Ur Specific Gravity  1.020  


 


Urine Protein  Negative  


 


Urine Glucose (UA)  Negative  


 


Urine Ketones  Negative  


 


Urine Blood  Negative  


 


Urine Nitrite  Negative  


 


Urine Bilirubin  Negative  


 


Urine Urobilinogen  0.2  


 


Ur Leukocyte Esterase  Negative  


 


Opiates Screen    Cancelled


 


Methadone Screen    Cancelled


 


Barbiturate Screen    Cancelled


 


Phencyclidine Screen    Cancelled


 


Ur Amphetamines Screen    Cancelled


 


MDMA (Ecstasy) Screen    Cancelled


 


Benzodiazepines Screen    Cancelled


 


Cocaine Screen    Cancelled


 


U Marijuana (THC) Screen    Cancelled











ASSESSMENT/PLAN:


30 y/o/f with PMhx of GERD, Asthma, hemorrhoids, Bipolar disorder sent here by 

PMD for right sided facial droop and urinary incontinence. 





1)Neuro deficits - Right sided facial droop, right sided weakness, urinary 

incontinence - unlikely to be due to CVA, concerning for MS vs. infectious 

source


-Neurology consulted, Dr. Alegria - recommended MRI to rule out MS


-Prednisone 60mg given once in ER


-MRI Brain and C-spine - normal 


-lyme serology pending


-RPR


-Vitamin B12, folic acid levels 


-U tox pending


-lithium level pending





2)Leukocytosis - patient afebrile


-WBC at 12.3


-UA negative for UTI


-CXR negative for acute pathology


-trend WBC





3)Bipolar disorder 


-continue home meds once reconciled 





4)Prophylaxis


-Lovenox





5)FEN


-Regular diet





6)Disposition


-admitted to med surg











Visit type





- Emergency Visit


Emergency Visit: Yes


ED Registration Date: 10/17/19


Care time: The patient presented to the Emergency Department on the above date 

and was hospitalized for further evaluation of their emergent condition.





- New Patient


This patient is new to me today: Yes


Date on this admission: 10/18/19





- Critical Care


Critical Care patient: No





ATTENDING PHYSICIAN STATEMENT





I saw and evaluated the patient.


I reviewed the resident's note and discussed the case with the resident.


I agree with the resident's findings and plan as documented.








SUBJECTIVE:








OBJECTIVE:








ASSESSMENT AND PLAN:

## 2021-08-19 ENCOUNTER — HOSPITAL ENCOUNTER (EMERGENCY)
Dept: HOSPITAL 74 - JER | Age: 31
Discharge: HOME | End: 2021-08-19
Payer: COMMERCIAL

## 2021-08-19 VITALS — SYSTOLIC BLOOD PRESSURE: 111 MMHG | HEART RATE: 61 BPM | DIASTOLIC BLOOD PRESSURE: 74 MMHG | TEMPERATURE: 98 F

## 2021-08-19 VITALS — BODY MASS INDEX: 33.3 KG/M2

## 2021-08-19 DIAGNOSIS — B37.9: Primary | ICD-10-CM

## 2021-08-19 LAB
APPEARANCE UR: CLEAR
BILIRUB UR STRIP.AUTO-MCNC: NEGATIVE MG/DL
COLOR UR: YELLOW
KETONES UR QL STRIP: NEGATIVE
LEUKOCYTE ESTERASE UR QL STRIP.AUTO: NEGATIVE
NITRITE UR QL STRIP: NEGATIVE
PH UR: 5.5 [PH] (ref 5–8)
PROT UR QL STRIP: NEGATIVE
PROT UR QL STRIP: NEGATIVE
SP GR UR: 1.02 (ref 1.01–1.03)
UROBILINOGEN UR STRIP-MCNC: 0.2 MG/DL (ref 0.2–1)

## 2021-12-19 ENCOUNTER — HOSPITAL ENCOUNTER (EMERGENCY)
Dept: HOSPITAL 74 - JERFT | Age: 31
Discharge: HOME | End: 2021-12-19
Payer: COMMERCIAL

## 2021-12-19 VITALS — BODY MASS INDEX: 33.3 KG/M2

## 2021-12-19 VITALS — SYSTOLIC BLOOD PRESSURE: 124 MMHG | HEART RATE: 68 BPM | DIASTOLIC BLOOD PRESSURE: 63 MMHG | TEMPERATURE: 98.7 F

## 2021-12-19 DIAGNOSIS — B34.9: Primary | ICD-10-CM

## 2021-12-19 PROCEDURE — C9803 HOPD COVID-19 SPEC COLLECT: HCPCS

## 2021-12-19 PROCEDURE — U0003 INFECTIOUS AGENT DETECTION BY NUCLEIC ACID (DNA OR RNA); SEVERE ACUTE RESPIRATORY SYNDROME CORONAVIRUS 2 (SARS-COV-2) (CORONAVIRUS DISEASE [COVID-19]), AMPLIFIED PROBE TECHNIQUE, MAKING USE OF HIGH THROUGHPUT TECHNOLOGIES AS DESCRIBED BY CMS-2020-01-R: HCPCS

## 2021-12-19 PROCEDURE — U0005 INFEC AGEN DETEC AMPLI PROBE: HCPCS

## 2021-12-20 ENCOUNTER — HOSPITAL ENCOUNTER (EMERGENCY)
Dept: HOSPITAL 74 - JER | Age: 31
Discharge: HOME | End: 2021-12-20
Payer: COMMERCIAL

## 2021-12-20 VITALS — DIASTOLIC BLOOD PRESSURE: 56 MMHG | TEMPERATURE: 97.8 F | SYSTOLIC BLOOD PRESSURE: 103 MMHG | HEART RATE: 66 BPM

## 2021-12-20 VITALS — BODY MASS INDEX: 32.9 KG/M2

## 2021-12-20 DIAGNOSIS — R09.81: ICD-10-CM

## 2021-12-20 DIAGNOSIS — J02.9: Primary | ICD-10-CM

## 2021-12-20 DIAGNOSIS — R05.1: ICD-10-CM

## 2022-03-29 ENCOUNTER — HOSPITAL ENCOUNTER (EMERGENCY)
Dept: HOSPITAL 74 - JER | Age: 32
Discharge: HOME | End: 2022-03-29
Payer: COMMERCIAL

## 2022-03-29 VITALS — HEART RATE: 91 BPM | SYSTOLIC BLOOD PRESSURE: 121 MMHG | DIASTOLIC BLOOD PRESSURE: 62 MMHG | TEMPERATURE: 97.4 F

## 2022-03-29 VITALS — BODY MASS INDEX: 31.6 KG/M2

## 2022-03-29 DIAGNOSIS — Z20.822: ICD-10-CM

## 2022-03-29 DIAGNOSIS — J30.2: Primary | ICD-10-CM

## 2022-03-29 PROCEDURE — U0005 INFEC AGEN DETEC AMPLI PROBE: HCPCS

## 2022-03-29 PROCEDURE — U0003 INFECTIOUS AGENT DETECTION BY NUCLEIC ACID (DNA OR RNA); SEVERE ACUTE RESPIRATORY SYNDROME CORONAVIRUS 2 (SARS-COV-2) (CORONAVIRUS DISEASE [COVID-19]), AMPLIFIED PROBE TECHNIQUE, MAKING USE OF HIGH THROUGHPUT TECHNOLOGIES AS DESCRIBED BY CMS-2020-01-R: HCPCS
